# Patient Record
Sex: FEMALE | Race: WHITE
[De-identification: names, ages, dates, MRNs, and addresses within clinical notes are randomized per-mention and may not be internally consistent; named-entity substitution may affect disease eponyms.]

---

## 2018-01-16 ENCOUNTER — HOSPITAL ENCOUNTER (OUTPATIENT)
Dept: HOSPITAL 45 - C.GI | Age: 68
Discharge: HOME | End: 2018-01-16
Attending: INTERNAL MEDICINE
Payer: COMMERCIAL

## 2018-01-16 VITALS
WEIGHT: 185.39 LBS | HEIGHT: 65 IN | BODY MASS INDEX: 30.89 KG/M2 | HEIGHT: 65 IN | BODY MASS INDEX: 30.89 KG/M2 | BODY MASS INDEX: 30.89 KG/M2 | WEIGHT: 185.39 LBS

## 2018-01-16 VITALS — OXYGEN SATURATION: 100 % | HEART RATE: 61 BPM

## 2018-01-16 VITALS — DIASTOLIC BLOOD PRESSURE: 92 MMHG | SYSTOLIC BLOOD PRESSURE: 177 MMHG

## 2018-01-16 DIAGNOSIS — D12.2: ICD-10-CM

## 2018-01-16 DIAGNOSIS — Z79.899: ICD-10-CM

## 2018-01-16 DIAGNOSIS — K64.8: ICD-10-CM

## 2018-01-16 DIAGNOSIS — Z12.11: Primary | ICD-10-CM

## 2018-01-16 DIAGNOSIS — I10: ICD-10-CM

## 2018-01-16 DIAGNOSIS — Z79.82: ICD-10-CM

## 2018-01-16 DIAGNOSIS — K57.30: ICD-10-CM

## 2018-01-16 DIAGNOSIS — Z86.010: ICD-10-CM

## 2018-01-16 NOTE — ANESTHESIOLOGY PROGRESS NOTE
Anesthesia Post Op Note


Date & Time


Jan 16, 2018 at 12:09





Vital Signs


Pain Intensity:  0





Vital Signs Past 12 Hours








  Date Time  Temp Pulse Resp B/P (MAP) Pulse Ox O2 Delivery O2 Flow Rate FiO2


 


1/16/18 11:47  62 20 147/83 (104) 100 Room Air  


 


1/16/18 11:32  75 24 151/83 (105) 97 Room Air  


 


1/16/18 09:12 36.5 67 20 165/97 (119) 97 Room Air  











Notes


Mental Status:  alert / awake / arousable, participated in evaluation


Pt Amnestic to Procedure:  Yes


Nausea / Vomiting:  adequately controlled


Pain:  adequately controlled


Airway Patency, RR, SpO2:  stable & adequate


BP & HR:  stable & adequate


Hydration State:  stable & adequate


Anesthetic Complications:  no major complications apparent

## 2018-01-16 NOTE — ENDO HISTORY AND PHYSICAL
History & Physical


Date of Service:


Jan 16, 2018.


Chief Complaint:


hx polyps


Referring Physician:


Dr. Davenport


History of Present Illness


66 yo CF who presents for colonoscopy secondary to history of colon polyps.





Past Surgical History


Hx Cardiac Surgery:  No


Hx Internal Defibrillator:  No


Hx Pacemaker:  No


Hx Abdominal Surgery:  No


Hx of Implantable Prosthesis:  No


Hx Post-Op Nausea and Vomiting:  No


Hx Cancer Surgery:  Yes (RT BREAST LUMPECTOMY)


Hx Thoracic Surgery:  No


Hx Orthopedic:  Yes (RT HAND MIDDLE FINGER CYST EXCISION)


Hx Urinary Tract Surgery:  No





Family History


None





Social History


Smoking Status:  Never Smoker


Hx Substance Use:  No


Hx Alcohol Use:  Yes (RARELY)





Allergies


Coded Allergies:  


     Levofloxacin (Verified  Allergy, Mild, NAUSEA AND VOMITING DIZZINESS, 1/16/ 18)





Current Medications





Reported Home Medications








 Medications  Dose


 Route/Sig


 Max Daily Dose Days Date Category


 


 [Red Yeast


 Rice/Coq10]  1 Cap


 PO HS


    1/2/18 Reported


 


 Omega-3 (Fish


 Oil) 1 Ea Cap  1 Cap


 PO HS


    1/2/18 Reported


 


 Turmeric Curcumin


  (Turmeric


  (Curcuma Longa))


 500 Mg Cap  1 Cap


 PO QAM


    1/2/18 Reported


 


 Multivitamin


  (Multiple


 Vitamin) 1 Tab Tab  1 Tab


 PO QAM


    1/2/18 Reported


 


 Aspirin Chewable


  (Aspirin) 81 Mg


 Chew  81 Mg


 PO QAM


    1/2/18 Reported


 


 Multimineral Plus


  (Multiple


 Vitamins W/


 Minerals) 1 Tab


 Tab  1 Tab


 PO QAM


    1/2/18 Reported


 


 Vitamin D3


  (Cholecalciferol)


 5,000 Unit Cap  1 Cap


 PO QAM


    1/2/18 Reported


 


 Toprol-Xl


  (Metoprolol


 Succinate) 50 Mg


 Tabcr  50 Mg


 PO QAM


    1/2/18 Reported











Vital Signs


Weight (Kilograms):  84.09


Height (Feet):  5


Height (Inches):  5











  Date Time  Temp Pulse Resp B/P (MAP) Pulse Ox O2 Delivery O2 Flow Rate FiO2


 


1/16/18 09:12 36.5 67 20 165/97 (119) 97 Room Air  











Physical Exam


General Appearance:  WD/WN, no apparent distress


Respiratory/Chest:  


   Auscultation:  breath sounds normal


Cardiovascular:  


   Heart Auscultation:  RRR


Abdomen:  


   Bowel Sounds:  normal


   Inspection & Palpation:  soft, non-distended, no tenderness, guarding & 

rebound





Assessment and Plan


Assessment:


66 yo CF who presents for colonoscopy secondary to history of colon polyps.








Plan:


Proceed with colonoscopy.

## 2018-01-16 NOTE — GI REPORT
Procedure Date: 1/16/2018 10:42 AM

Procedure:            Colonoscopy

Indications:          Screening for colorectal malignant neoplasm

Medicines:            Monitored Anesthesia Care

Complications:        No immediate complications.

Estimated Blood Loss: Estimated blood loss: none.

Procedure:            Pre-Anesthesia Assessment:

                      - Prior to the procedure, a History and Physical was 

                      performed, and patient medications and allergies were 

                      reviewed. The patient's tolerance of previous 

                      anesthesia was also reviewed. The risks and benefits of 

                      the procedure and the sedation options and risks were 

                      discussed with the patient. All questions were 

                      answered, and informed consent was obtained. Prior 

                      Anticoagulants: The patient has taken aspirin, last 

                      dose was 2 days prior to procedure. ASA Grade 

                      Assessment: II - A patient with mild systemic disease. 

                      After reviewing the risks and benefits, the patient was 

                      deemed in satisfactory condition to undergo the 

                      procedure.

                      After I obtained informed consent, the scope was passed 

                      under direct vision. Throughout the procedure, the 

                      patient's blood pressure, pulse, and oxygen saturations 

                      were monitored continuously. The On-site loaner was 

                      introduced through the anus and advanced to the 

                      terminal ileum. The colonoscopy was performed without 

                      difficulty. The patient tolerated the procedure well. 

                      The quality of the bowel preparation was good. The 

                      terminal ileum, ileocecal valve, appendiceal orifice, 

                      and rectum were photographed.

Findings:

     The perianal and digital rectal examinations were normal.

     A 20 mm polyp was found in the ascending colon. The polyp was 

     semi-sessile. The polyp was removed with a saline injection-lift 

     technique using a hot snare and The polyp was removed with a piecemeal 

     technique using a hot snare at 20 mosley. Resection and retrieval were 

     complete using a suction (via the working channel). Two hemostatic clips 

     were successfully placed (MR conditional).

     Multiple small-mouthed diverticula were found in the sigmoid colon.

     Non-bleeding internal hemorrhoids were found during retroflexion. The 

     hemorrhoids were small.

Impression:           - One 20 mm polyp in the ascending colon, removed using 

                      injection-lift and a hot snare and removed piecemeal 

                      using a hot snare. Resected and retrieved. Clips (MR 

                      conditional) were placed.

                      - Diverticulosis in the sigmoid colon.

                      - Non-bleeding internal hemorrhoids.

Recommendation:       - Resume previous diet.

                      - Continue present medications.

                      - Repeat colonoscopy date to be determined after 

                      pending pathology results are reviewed for surveillance 

                      after piecemeal polypectomy.

                      - Return to primary care physician as previously 

                      scheduled.

Jass Patino, 

1/16/2018 11:46:55 AM

This report has been signed electronically.

Note Initiated On: 1/16/2018 10:42 AM

     I attest to the content of the Intraoperative Record and orders 

     documented therein, exceptions below

## 2018-01-16 NOTE — DISCHARGE INSTRUCTIONS
Endoscopy Patient Instructions


Date / Procedure(s) Performed


Jan 16, 2018.


Colonoscopy





Allergy Information


Coded Allergies:  


     Levofloxacin (Verified  Allergy, Mild, NAUSEA AND VOMITING DIZZINESS, 1/16/ 18)





Discharge Date / Findings


Jan 16, 2018.


Colon polyp


Diverticulosis


Internal hemorrhoids





Provider Instructions





Activity Restrictions





-  No exercising or heavy lifting for 24 hours. 


-  Do not drink alcohol the day of the procedure.


-  Do not drive a car or operate machinery until the day after the procedure.


-  Do not make any important decisions or sign important papers in 24 hours 

after the procedure.





Following Day:





-  Return to full activity which may include returning to work/school.





Diet





Start your diet with liquids and light foods (jello, soup, juice, toast).  Then 

eat your usual diet if not nauseated.





Treatment For Common After Affects





For mild abdominal pain, bloating, or excessive gas:





-  Rest


-  Eat lightly


-  Lie on right side





Follow-Up Information


Follow-up with Dr. Davenport as scheduled





Anesthesia Information





What You Should Know





You have had a procedure that required some medicine to reduce anxiety and 

discomfort. This treatment is called moderate sedation.  


After receiving the treatment, you may be sleepy, but you will be able to 

breathe on your own.  The effects of the treatment may last for several hours.








Follow these instructions along with Activity/Diet recommendations noted above:





*  Do NOT do anything where dizziness or clumsiness would be dangerous.





*  Rest quietly at home today, then you can be up and about tomorrow.





*  Have a responsible person stay with you the rest of today.





*  You may have had an I.V. today.  If so, you may take the dressing off later 

today.





Recommendations


 


Call your doctor if:





*  Trouble breathing 





*  Continuous vomiting for more than 24 hours








*  Temperature above 101 degrees





*  Severe abdominal pain or bloating





*  Pain not relieved by pain medicine ordered





*  There is increased drainage or redness from any incision





*  A large amount of rectal bleeding greater than 2-3 tablespoons. 


   (If you had a polyp/s removed or have hemorrhoids, a small amount of blood -


    from the rectum is to be expected.)





*  You have any unanswered questions or concerns.








IN THE EVENT OF A SERIOUS EMERGENCY, GO TO THE NEAREST EMERGENCY ROOM








       Your discharge instructions were prepared by provider Jass Patino.





 Patient Instructions Signature Page














Jerrica Ferrell 











Patient (or Guardian) Signature/Date:____________________________________ I 

have read and understand the instructions given to me by my caregivers.








Caregiver/RN/Doctor Signature/Date:____________________________________











The above-named patient and/or guardian has received patient instructions on 

this date.





























+  Original Patient Signature Page (only) stays with chart.  Please make copy 

for patient.